# Patient Record
Sex: FEMALE | Race: WHITE | NOT HISPANIC OR LATINO | Employment: FULL TIME | URBAN - METROPOLITAN AREA
[De-identification: names, ages, dates, MRNs, and addresses within clinical notes are randomized per-mention and may not be internally consistent; named-entity substitution may affect disease eponyms.]

---

## 2024-01-03 ENCOUNTER — TRANSFERRED RECORDS (OUTPATIENT)
Dept: HEALTH INFORMATION MANAGEMENT | Facility: CLINIC | Age: 44
End: 2024-01-03
Payer: COMMERCIAL

## 2024-02-19 ENCOUNTER — MEDICAL CORRESPONDENCE (OUTPATIENT)
Dept: HEALTH INFORMATION MANAGEMENT | Facility: CLINIC | Age: 44
End: 2024-02-19
Payer: COMMERCIAL

## 2024-02-20 ENCOUNTER — TRANSFERRED RECORDS (OUTPATIENT)
Dept: MULTI SPECIALTY CLINIC | Facility: CLINIC | Age: 44
End: 2024-02-20

## 2024-02-20 LAB
GLUCOSE (EXTERNAL): 83 MG/DL (ref 74–100)
POTASSIUM (EXTERNAL): 4.3 MMOL/L (ref 3.4–5.1)

## 2024-02-20 NOTE — PROGRESS NOTES
GYNECOLOGIC ONCOLOGY CONSULT    Patient Name: MARKUS FINNEGAN Patient : 1980   Patient MRN: [id number]    Referring Physician: Shade Mcallister MD (General Surgery)   Primary GYN Oncologist: Jane Severino (Hematology/Oncology)  Date of Service: 2023     Reason for Consult:  Uterine fibroids  Heavy, vaginal bleeding  ER+/WI+/Her2+ breast cancer, on tamoxifen  Li-Fraumeni syndrome    History of Present Illness (Gyn Oncology):  Tiana is a shell 44 yo  premenopausal female with recent diagnosis of Stage IA triple-positive invasive carcinoma of right breast. She underwent surgery and initiated Tamoxifen 20 mg daily on 2023.    When she was undergoing imaging workup for her breast cancer, there was an enlarged uterus noted. Most recent PET CT scan on 9/15/2023 did not show FDG-avid uptake within the pelvis. However, given the abnormality is noted, she underwent a pelvic ultrasound on 10/03/2023. This was notable for a 12.9 x 7.3 x 7.1 cm uterus. There were several fibroids noted, a total of 4 varying in size by most around 3 to 5 cm in greatest dimension. Bilateral ovaries appeared normal. There is also a submucosal fibroid towards the lower uterine segment, and this one was about 4 cm the endometrium, otherwise, was hemangiomas, there is no free fluid noted. She was recommended for a referral to see GYN oncology for further discussion. In terms of her breast cancer treatment, there does not appear any strong indication for ovarian suppression. She is tolerating her tamoxifen, endocrine therapy well at this time. She has been on tamoxifen since December. She denies any side effects until February. After that, she notes heavy vaginal bleeding for 5-6 days. Last month, she had light vaginal bleeding, which lasted for a day. She has always had a heavy menstrual cycle. She denies fibroids or enlarged uterus during her pregnancy. She has recently tested positive for Li-Fraumeni syndrome, and she has  established care within the [facility] genetics department. She consulted with Miguel Angel Sterling MD on 10/31/2023 at [facility]. Her 2 sons were tested and were negative. Her daughter was found to be positive, and interestingly, did have a history of a brain tumor at age 3 yo, and is undergoing recommended screening. Patient denies any family history of gynecologic malignancies. She is here today for further information in regards to these ultrasound findings and is considering surgical management. She has an appointment later this week with a dermatologist to do a baseline skin cancer screening. She denies any abnormal Pap smears. Laparoscopic converted to open abdominal mass resection by Dr. Bright Connors. 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 She recovered well from post-operatively. She endorses post-op nausea with anesthesia. However, she is managing the symptoms well. She is not interested in cosmetic surgery for her breast at present. However, she will follow up with Dr. Graves in plastic surgery if she decides to do more future cosmetic surgeries. Her breast surgeon is Dr. Shade Mcallister MD.     Genetic Testing  Positive for germline Li-Fraumeni syndrome. Pathogenic variant in TP53 (heterozygous) associated with autosomal dominant Li-Fraumeni syndrome.    Review of Systems:  A complete 14-point review of systems is negative except as noted in the above history of present illness.    Past Medical History:  1. Hypertension  2. Adjustment disorder with anxiety  3. GERD  4. Li-Fraumeni syndrome    Surgical History:  1. Bilateral mastectomies with immediate reconstruction  by Aury Mcallister and Star - 2022  2. Laparoscopic converted to open abdominal mass resection by Dr. Bright Connors. 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 - 2023    OB/Gyn History:  .  x 3.  Age at menarche: 13 yo  Age at first birth: 21  yo  Ovarian suppression 2/2 breast cancer endocrine therapy. Currently, on Tamoxifen.  Denies hx of abnormal pap smears.  Denies hx of STIs.  Reports uterine fibroids. Denies any past hx of fibroids, this was recent diagnosis.  Denies hx of ovarian cysts.  Denies hx of endometriosis.  Reports hx of OCP use.  Vasectomy is current contraceptive method.  Denies hx of HRT use.  Currently, sexually active.    Allergies#  No known medication allergies    Medications:  Magnesium Oral 250 mg tablet 1 tablet BID  Venlafaxine 37.5 mg capsule,extended release 24hr TAKE ONE CAPSULE BY MOUTH DAILY  Lisinopril Oral 5 mg tablet 1 tablet Daily  Omeprazole Oral Delayed Release Capsule 20 mg capsule,delayed release(DR/EC) 1 capsule,delayed release(DR/EC) Daily  Tamoxifen Oral 20 mg tablet 1 tablet orally daily. Take whole with water.  Fexofenadine Oral 60 mg tablet 1 tablet BID  Amlodipine Oral 5 mg tablet 1 tablet Daily     Family History:  Mother - lung cancer at age 49 - heavy smoker. .  MGF - liver Ca at age 51. .  2 maternal aunts with breast cancer age < 50. Both alive.    Social History:  Smoking Status Smoking Tobacco : Former smoker; Smokeless Tobacco : Never used smokeless tobacco; Vaping : Never vaped  Former smoker, 1/4 PPD x 4 years. Quit in .  Reports occasional alcohol use, ~4-6 glasses of wine per week.  Denies illicit substance use.  . Lives at home with  and 2 children.  Works at [employer] in [employer].    Health Maintenance:  Pap smear: last in 2022 and reportedly normal  Mammogram: 2022 & led to breast Ca diagnosis  Colonoscopy: has never had a colonoscopy  DEXA scan: has never had a DEXA scan    Vital Signs:  Blood pressure: 122/85, Sitting, R arm, Regular, Pulse: 122, Temperature: 97.3 F, Respirations: 16, O2 sat: 99%, At Rest, Room Air, Pain Scale: 0, Height: 63 in, Weight: 153.5 lb, BSA: 1.73, BMI: 27.19 kg/m2     Physical Exam (Gyn  Oncology):  General: alert, cooperative, no acute distress  HEENT: normocephalic, trachea midline, no thyromegaly  LN: no supraclavicular, cervical or inguinal lymphadenopathy  Back: no CVA tenderness  Lungs: CTAB, good inspiratory effort, no wheezes  Cardiac: RRR, no murmurs heard  Abdomen: Well healed supraumbilical vertical midline incision about 10 cm in length. No other incisions are present.   Extremities: no edema, non-tender  Neurologic Exam: no focal deficits    Pelvic: Normal external genitalia without masses or lesions. On speculum examination, the cervix is very posterior but without masses or lesions. The vaginal mucosa appears moderately estrogenized without masses or lesions. On bimanual examination, the vaginal mucosa and cervix are palpably smooth. The uterus is noted to be moderately enlarged to about 14 weeks size and there is fullness in the posterior cul-de-sac secondary to the fibroids. The uterus is freely mobile and there appears to be adequate room within the pelvis for attempt at a vaginal specimen removal during a minimally invasive surgery. There does not appear to be any palpable adnexal fullness. Rectal vaginal examination deferred.     Laboratory Data:  CBC  Lab Results 03/23/2023 02/28/2023 02/20/2023 02/07/2023 01/11/2023 12/13/2022    CBC               CMP  Lab Results 03/23/2023 02/28/2023 02/20/2023 02/07/2023 01/11/2023 12/13/2022    Chemistries                 Albumin g/dL       4.7 4.7       Total protein g/dL       7.7 7.8       Bilirubin, total mg/dL       1.2 0.9       Bilirubin, direct mg/dL       0.4 (H) 0.3       Alkaline phosphatase U/L       67 82       AST/SGOT U/L       44 (H) 117 (H)       ALT/SGPT U/L       40 93 (H)     Lab Results 03/23/2023 02/28/2023 02/20/2023 02/07/2023 01/11/2023 12/13/2022    Tumor Markers                    Imaging:  Viewpoint Report - Final 10/03/2023 20:25  Indication  enlarged uterus  Method  Transvaginal ultrasound examination. View:  Suboptimal view: fibroids prevented a clear view  Uterus  fibroid. Long 129 mm x ap 73 mm x tr 71 mm. Vol 350.0 cm3  Position: anteverted  Malformations: none identified  Myometrium: fibroids  Endometrium: homogeneous, fibroid NELLA. Endometrial thickness, total 11.5 mm  Cervix details: normal  Fibroid(s) 1. Size 35 mm x 35 mm x 40 mm. Mean 36.8 mm. Vol 25.931 cm3. Intramural.  Anterior  2. Size 47 mm x 49 mm x 35 mm. Mean 43.5 mm. Vol 41.927 cm3. Intramural. Right  lateral wall  3. Size 30 mm x 32 mm x 36 mm. Mean 32.7 mm. Vol 18.127 cm3. Intramural. Fundal  4. Size 40 mm x 15 mm x 17 mm. Mean 23.9 mm. Vol 5.245 cm3. visualized without  fluid enhancement  Right Ovary  Normal. Outline: smooth. Morphology: normal. Size 31 mm x 25 mm x 21 mm. Vol  8.4 cm3  Left Ovary  Normal. Outline: smooth. Morphology: normal. Size 34 mm x 34 mm x 29 mm. Vol  17.1 cm3  Cul de Sac  Visualized. No free fluid visualized    Impression  Fibroid uterus with the four largest fibroids measured as noted above. There appears to be a submucosal fibroid  towards the lower uterine segment that measures 40mm.  The endometrium is otherwise homogenous. The ovaries appear normal bilaterally. No free fluid is noted.    Comment  Pelvic ultrasound alone is not considered diagnostic. Clinical correlation recommended.       Problems:  Breast cancer, female ( Stage Date: 10/25/2022, Stage IA (Right breast overlapping sites, N0, M0, ER Status: Positive, TX Status: Positive, HER-2/shyanne Status: Positive)-Clinical  Date of Dx:10/25/2022 Extent of Disease: Evidence of local disease; Disease State: Initial diagnosis; Histopathologic Type: Ductal invasive carcinoma; Menopausal Status: Premenopausal; )  Adjustment disorder with anxiety  Adnexal mass  Arthropathy (disorder)  At risk of malignancy (finding)  Estrogen receptor positive status [ER+] ( Date of Dx:11/04/2022 )  Family history of malignant neoplasm of breast (situation)  Hot flash caused by medication  (disorder)  Lesion of liver (finding)  Li-Fraumeni syndrome (disorder)  Liver function tests abnormal (finding)  Mass of retroperitoneal structure (finding)  Survivorship issues  Uterine fibroids  Vitamin D deficiency (disorder)    Assessment & Plan (Gyn Oncology):  Tiana is a shell 42 yo  premenopausal female with recent diagnosis of Stage IA triple-positive invasive carcinoma of right breast. She underwent surgery and initiated Tamoxifen 20 mg daily on 2023. During breast cancer workup in treatment, has been noted to have abnormal uterine findings with fibroids. Recently discovered to have Li-Fraumeni syndrome.     1. Uterine fibroids and heavy bleeding  Discussed with patient risks, benefits, and alternatives to definitive surgical management for treatment of the heavy vaginal bleeding as well as ability to definitively test the tissue with the uterus to evaluate for any pre-malignant or malignant conditions.  After today's pelvic examination, I do feel she is an excellent candidate for a minimally invasive surgery with a robotic approach. Also discussed injection of ICG dye for possible bilateral sentinel pelvic lymph node dissections.  Discussed intraoperative frozen section analysis and further staging if that were to be positive in regards to the endometrium and/or some of the different types of fibroid cancers, such as carcinosarcoma, et cetera.  Discussed that staging would involve sentinel lymph node mapping and sentinel lymph node dissection. Discussed that sentinel lymph node dissection can decrease overall rate of lymphedema in lower extremities from about 30% with full lymph node dissections to <5%.   Discussed the importance of removal of uterus without any morcellation or disruption, in the event that cancer cells could be seeded into the abdominal cavity. Would convert to horizontal mini-laparotomy and would still likely go home on POD#0. Would ask anesthesia for TAP block, in the  event that a mini-laparotomy may need to be performed. This would also help with narcotic-sparing postoperative course. She is on board with the plan for a TAP block.  2. Triple positive breast cancer on tamoxifen therapy since January 2023.  Discussed that there is association in elevated risk of certain types of endometrial cancers on tamoxifen therapy.  Given some episodes of abnormal and heavy vaginal bleeding, would recommend either very close follow-up with repeat imaging and possible endometrial biopsy. However, surgical management would be warranted at this point.  From our understanding. LFS does not appear to have significantly elevated risks of endometrial or ovarian cancers. However, genetics is constantly evolving and, thus, the cancer screening recommendations and the known cancer associations can change over time. She is established with the [facility] genetics department.  Medical oncology does not specifically need surgical ovarian suppression, per their notes.  Discussed pros and cons of preservation of ovaries versus removal of ovaries.  Patient is already in ovarian suppression due to her adjuvant endocrine therapy. Discussed that there are no good screening strategies for detection of future ovarian cancers. However, there will need to be some individualized decision-making with or without removal of ovaries.  Discussed that removal of both fallopian tubes would be indicated and does offer risk reduction for future ovarian cancers, given etiology of most cancers is the fimbriated ends of the fallopian tubes.  With Li-Fraumeni syndrome, the patient is established with [facility]. Again, no significant elevated risk of known endometrial or ovarian cancers. Thus, would not be recommended surgical risk reduction just from a genetics standpoint alone.  However, patient feels strongly about preventative measures and does have concerns about future cancer risk. Patient feels like she is leaning  towards surgical intervention. She desires more time to consider her options.  Patient aware medical oncology would be updated if we did remove ovaries as they would likely switch her adjuvant endocrine therapy.   Patient was given hysterectomy packet to read more information in regards to after cares. We had a very good conversation today. Aware that sometimes a mini-laparotomy is necessary for specimen removal if having difficulty removing vaginally as would not morcellated or bi-valve uterus for risk of seeding any potential malignancies. Patient will call our office back after she has had some time to process the information. And if she decides to proceed with surgery, aware that removal of ovaries can continue to be discussed and I would support her decision, in either scenario, after she has had some time to consider her options.      Treatment Plan and Consent  Current treatment plan of definitive surgery vs watchful waiting/close observation was reviewed in detail with the patient. See counseling above. Specifically, the counseling included: goals of the treatment, prognosis, frequency, intended benefits, associated risks/harms and side effects and medically reasonable alternatives.    I spent a total of 75 minutes of cumulative time in care of this patient, which included >50% of time spent in direct patient care which included patient interview, examination, and treatment counseling. The remainder of the time was spent in medical documentation, review of records and care coordination.    I provided the patient an opportunity to ask questions and I answered all of their treatment related questions to the best of my ability.  The patient expressed understanding and consent to this plan of care.    Pain Care Management:  Pain Scale: 0      The patient and family/friends if present were apprised of the use of  remote documentation service and all parties consented to conducting the visit in this  manner.    Documentation assistance provided by Nate Doran, scribing for Anna Bajwa MD on 11/07/2023.    I, Anna Bajwa MD,  personally performed the services described in this documentation, and it is both accurate and complete.    Anna Bajwa MD  Phone: [phone]  Fax: [phone]

## 2024-02-23 RX ORDER — AMLODIPINE BESYLATE 5 MG/1
5 TABLET ORAL DAILY
COMMUNITY

## 2024-02-23 RX ORDER — LISINOPRIL 10 MG/1
10 TABLET ORAL DAILY
COMMUNITY

## 2024-02-23 RX ORDER — VENLAFAXINE HYDROCHLORIDE 37.5 MG/1
37.5 TABLET, EXTENDED RELEASE ORAL DAILY
COMMUNITY

## 2024-02-23 RX ORDER — TAMOXIFEN CITRATE 20 MG/1
20 TABLET ORAL DAILY
COMMUNITY

## 2024-02-23 NOTE — OP NOTE
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     ESTIMATED BLOOD LOSS: 50 cc     IV FLUIDS: 1700 ml crystalloid    URINE OUTPUT: 15 cc clear urine     INDICATIONS: Adelaida Tolentino is a 44 yo  premenopausal female with recent diagnosis of Stage IA triple-positive invasive carcinoma of right breast. She underwent surgery and initiated Tamoxifen 20 mg daily on 2023. When she was undergoing imaging workup for her breast cancer, there was an enlarged uterus noted. Most recent PET CT scan on 9/15/2023 did not show FDG-avid uptake within the pelvis. However, given the abnormality is noted, she underwent a pelvic ultrasound on 10/03/2023. This was notable for a 12.9 x 7.3 x 7.1 cm uterus. There were several fibroids noted, a total of 4 varying in size by most around 3 to 5 cm in greatest dimension. Bilateral ovaries appeared normal. There was also a submucosal fibroid towards the lower uterine segment, and this one was about 4 cm. She was recommended for a referral to see GYN oncology for further discussion. In terms of her breast cancer treatment, there does not appear any strong indication for ovarian suppression. She is tolerating her tamoxifen, endocrine therapy well at this time. She has been on tamoxifen since December. She has heavy, vaginal bleeding with history of heavy menses. She has recently tested positive for Li-Fraumeni syndrome, and she has established  care within the [facility] genetics department. She consulted with Miguel Angel Sterling MD on 10/31/2023 at [facility]. Her 2 sons were tested and were negative. Her daughter was found to be positive, and interestingly, did have a history of a brain tumor at age 3 yo, and is undergoing recommended screening. Patient denies any family history of gynecologic malignancies. She desired to proceed with definitive surgical management.     FINDINGS:  On laparoscopy, there was moderately enlarged, multifibroid uterus with paracervical fibroid on right side. Bilateral ovaries were polycystic-appearing and mildly enlarged, but, grossly normal. Bilateral ureters were without evidence of hydronephrosis. There was no bulky retroperitoneal adenopathy. Bilateral sentinel lymph node mapping was performed and notable for bilateral obturator space. Intraoperative frozen section analysis revealed endometrial polyp that was negative for malignancy, thus, no cancer staging procedures were indicated. Peritoneal surfaces were smooth including bilateral hemidiaphragms. The liver edge, stomach and omentum were all grossly normal. Small bowel, appendix and colon were without any obvious abnormalities. Bilateral ovaries were preserved and notable for good perfusion throughout the case. Neither side had excess laxity. A total of 10 ml of Surgiflo was placed along surgical dissection sites at the conclusion of the procedure.    At the conclusion of the procedure, the vagina was inspected and there were no perineal lacerations. The vaginal cuff was palpably intact. There was no vaginal bleeding.    SPECIMENS:   ID Type Source Tests Collected by Time Destination   1 : frozen on endometrium Tissue Uterus, Cervix, Bilateral Fallopian Tubes SURGICAL PATHOLOGY EXAM Anna Bajwa MD 2/26/2024  9:38 AM        COMPLICATIONS: None.    CONDITION: Stable to PACU.    OPERATIVE PROCEDURE IN DETAIL: Consent was reviewed with the patient in the preoperative  setting and confirmed. She received prophylactic antibiotics with IV Cefazolin 2 grams and IV Metronidazole 500 milligrams. In addition, she received prophylactic SQ Heparin 5,000 units for venous thromboembolism prevention. A surgical debriefing was performed with the operating room team prior to patient entry into the operating room. The patient was transferred to the operating room and placed in dorsal supine position. General anesthetic was obtained in the usual manner without noted difficulties. The patient was then positioned onto yellowfin stirrups. The patient was prepped and draped for the above-mentioned procedure.    Timeout was called at which point the patient's name, procedure and operative site was confirmed by the operative team. Initially, the instruments for the vaginal manipulator were positioned, a speculum was placed in the vagina. The Meléndez catheter was placed. The anterior lip of the cervix was grasped. Next, the uterus sounded to 10 cm, and cervix was serially dilated. Indocyanine green (ICG) dye was injected superficially and deep at 3 o'clock and 9 o'clock. Approximately 1 cc was used x 4. The medium VCare vaginal manipulator was then inserted and the cervical cup affixed without any difficulty. After changing gown and gloves, attention was then turned to the upper abdomen. Initially, the umbilicus was elevated and the Veress needle introduced through the base of the umbilicus. The abdomen was insufflated with an opening pressure of 2 mmHg. The Veress needle was removed. Sites for the da Fannie XI laparoscopic ports were demarcated, total of 5, initiating' midline approximately 4 cm above the umbilicus and positioned in a straight line around the upper abdomen. The initial midline 8 mm port was inserted without difficulties, the left 2 lateral 8 mm da Fannie ports were inserted and on the right 2 lateral 8 mm da Fannie ports all under direct visualization without any vascular injury noted. The  lateral most right port was 8 mm AirSeal port. CO2 insufflation was switched over to AirSeal mode without any difficulties. At this point, the patient was placed into steep Trendelenburg. The pelvis was inspected as well as the upper abdomen as noted above. Bowels were packed up into the upper abdomen with gentle traction. At this point, da Fannie XI was docked onto the ports, all appropriate instruments were inserted.     The procedure was initiated by sealing and transecting bilateral round ligaments. This allowed dissection of the vesicouterine peritoneum. Next, each retroperitoneum was entered via dissection of each posterior leaf of the broad ligament. Bilateral ureters were identified. Infundibulopelvic ligaments were preserved, bilaterally. Each fimbriated edge of the fallopian tubes were grasped, elevated and dissection off the mesosalpinx using the synchroseal device to both seal and transect. Each fallopian tube was dissected to the level of the uterine cornua. The synchroseal was used to multiply seal and transect the utero-ovarian ligaments at the uterine level. The peritoneum was further undermined to allow for retraction of each ovary for planned preservation.     Next, bilateral sentinel lymph node mapping was performed. The da Fannie XI firefly mode was utilized to denote the ICG uptake along lymphatic channels. A sentinel node was mapped bilaterally with further details above.     At this point, we initiated the the hysterectomy. The bladder flap was developed well at the upper vagina. The uterus had adequate mobility, bilateral uterine arteries could be isolated and these were cauterized with the bipolar cautery and transected. Initially on the right, we extended along the cardinal and uterosacral ligaments, staying close to the cervix to the level of the upper vagina. This was cauterized and transected using the synchroseal device. This side was more technically challenging given the right  "paracervical fibroid. There was also more collateral blood flow. We isolated out the left cardinal ligament and uterosacral ligament which were cauterized and transected. At this point, we were able to palpate the line of the VCare cup at the level of the upper vagina in a circumferential manner. We incised along the upper vagina to resect the cervix and uterus. The VCare uterine manipulator was disconnected from the surgical specimen. Next, a 15 mm Endocatch bag was inserted transvaginally for specimen containment. The specimen was brought out through the vaginal opening and labeled \"uterus, cervix, and bilateral fallopian tubes\" and sent to surgical pathology for intraoperative frozen section analysis of the endometrium.    A wet laparotomy sponge was inserted to obtain adequate insufflation. The vaginal cuff had been well-developed and clearly the bladder was out of the way. The vaginal cuff was closed with running V-lock suture with excellent reapproximation.     The right ureter was again identified. The right ureter was skeletonized from the level of the pelvic brim to the tunnel of Wertheim. This allowed for further cautery to the uterine pedicle near the origin of the uterine artery and uterine vein. This reduced overall blood loss, by further addressing this area given more collateral blood flow on this side.     Intraoperative frozen section returned negative for malignancy, thus, no cancer staging procedures were indicated.      At this point, the vaginal balloon was removed from the vagina. The pelvis was re-inspected and copiously irrigated. A total of 10 cc of Surgiflo was placed along the pelvic dissection beds. Bilateral ovaries remained in situ, with good perfusion noted and without any excess laxity.    All laparoscopic instruments and ports were now removed and CO2 allowed to escape from the abdomen. The "[employer]i XI robot was undocked without incident. Skin was reapproximated with 4-0 Vicryl " sutures and skin glue applied. Local analgesia was applied to all incisions at time of closure.     The laparotomy sponge was removed from the vagina. The Meléndez catheter was also removed. The vagina was inspected and without any evidence of perineal lacerations and/or bleeding. The vaginal cuff was palpably intact.     Stephanie Hobbs PA-C, was present and assisted the entire procedure. She assisted with injection of ICG dye, placement of uterine manipulator, docking of robotic arms, adequate visualization, retraction, uterine manipulation, bedside assisting via assistant port, specimen handling/retrieval, undocking of robotic arms, injection of local analgesia, and skin closure.      All sponge, lap, needle and instrument counts were correct x 2. The patient tolerated the procedure well and there were no complications. She was returned to the supine position and general anesthesia was reversed without difficulty. She was taken to the recovery room in stable condition. I was present and scrubbed the entire procedure.    Anna Bajwa MD  Gynecologic Oncology  [facility]  02/26/24

## 2024-02-26 ENCOUNTER — HOSPITAL ENCOUNTER (OUTPATIENT)
Facility: CLINIC | Age: 44
Discharge: HOME | End: 2024-02-26
Attending: OBSTETRICS & GYNECOLOGY | Admitting: OBSTETRICS & GYNECOLOGY
Payer: COMMERCIAL

## 2024-02-26 ENCOUNTER — ANESTHESIA (OUTPATIENT)
Dept: SURGERY | Facility: CLINIC | Age: 44
End: 2024-02-26
Payer: COMMERCIAL

## 2024-02-26 ENCOUNTER — ANESTHESIA EVENT (OUTPATIENT)
Dept: SURGERY | Facility: CLINIC | Age: 44
End: 2024-02-26
Payer: COMMERCIAL

## 2024-02-26 VITALS
BODY MASS INDEX: 25.91 KG/M2 | SYSTOLIC BLOOD PRESSURE: 106 MMHG | HEIGHT: 63 IN | OXYGEN SATURATION: 98 % | TEMPERATURE: 98.2 F | WEIGHT: 146.2 LBS | RESPIRATION RATE: 12 BRPM | DIASTOLIC BLOOD PRESSURE: 76 MMHG | HEART RATE: 64 BPM

## 2024-02-26 DIAGNOSIS — D25.9 UTERINE LEIOMYOMA, UNSPECIFIED LOCATION: Primary | ICD-10-CM

## 2024-02-26 LAB
ABO/RH(D): NORMAL
ANTIBODY SCREEN: NEGATIVE
ERYTHROCYTE [DISTWIDTH] IN BLOOD BY AUTOMATED COUNT: 13.2 % (ref 10–15)
HCG UR QL: NEGATIVE
HCT VFR BLD AUTO: 37.1 % (ref 35–47)
HGB BLD-MCNC: 13.1 G/DL (ref 11.7–15.7)
MCH RBC QN AUTO: 33 PG (ref 26.5–33)
MCHC RBC AUTO-ENTMCNC: 35.3 G/DL (ref 31.5–36.5)
MCV RBC AUTO: 94 FL (ref 78–100)
PLATELET # BLD AUTO: 256 10E3/UL (ref 150–450)
RBC # BLD AUTO: 3.97 10E6/UL (ref 3.8–5.2)
SPECIMEN EXPIRATION DATE: NORMAL
WBC # BLD AUTO: 4.3 10E3/UL (ref 4–11)

## 2024-02-26 PROCEDURE — 258N000001 HC RX 258: Performed by: OBSTETRICS & GYNECOLOGY

## 2024-02-26 PROCEDURE — 250N000025 HC SEVOFLURANE, PER MIN: Performed by: OBSTETRICS & GYNECOLOGY

## 2024-02-26 PROCEDURE — 258N000003 HC RX IP 258 OP 636

## 2024-02-26 PROCEDURE — 360N000080 HC SURGERY LEVEL 7, PER MIN: Performed by: OBSTETRICS & GYNECOLOGY

## 2024-02-26 PROCEDURE — 250N000011 HC RX IP 250 OP 636

## 2024-02-26 PROCEDURE — 710N000012 HC RECOVERY PHASE 2, PER MINUTE: Performed by: OBSTETRICS & GYNECOLOGY

## 2024-02-26 PROCEDURE — 258N000003 HC RX IP 258 OP 636: Performed by: ANESTHESIOLOGY

## 2024-02-26 PROCEDURE — 58570 TLH UTERUS 250 G OR LESS: CPT | Performed by: ANESTHESIOLOGY

## 2024-02-26 PROCEDURE — 250N000011 HC RX IP 250 OP 636: Performed by: ANESTHESIOLOGY

## 2024-02-26 PROCEDURE — 88307 TISSUE EXAM BY PATHOLOGIST: CPT | Mod: 26 | Performed by: STUDENT IN AN ORGANIZED HEALTH CARE EDUCATION/TRAINING PROGRAM

## 2024-02-26 PROCEDURE — 250N000013 HC RX MED GY IP 250 OP 250 PS 637: Performed by: PHYSICIAN ASSISTANT

## 2024-02-26 PROCEDURE — 250N000009 HC RX 250

## 2024-02-26 PROCEDURE — 250N000011 HC RX IP 250 OP 636: Mod: JZ | Performed by: PHYSICIAN ASSISTANT

## 2024-02-26 PROCEDURE — 88331 PATH CONSLTJ SURG 1 BLK 1SPC: CPT | Mod: 26 | Performed by: PATHOLOGY

## 2024-02-26 PROCEDURE — 81025 URINE PREGNANCY TEST: CPT | Performed by: PHYSICIAN ASSISTANT

## 2024-02-26 PROCEDURE — 250N000013 HC RX MED GY IP 250 OP 250 PS 637: Performed by: ANESTHESIOLOGY

## 2024-02-26 PROCEDURE — 36415 COLL VENOUS BLD VENIPUNCTURE: CPT | Performed by: PHYSICIAN ASSISTANT

## 2024-02-26 PROCEDURE — 370N000017 HC ANESTHESIA TECHNICAL FEE, PER MIN: Performed by: OBSTETRICS & GYNECOLOGY

## 2024-02-26 PROCEDURE — 88331 PATH CONSLTJ SURG 1 BLK 1SPC: CPT | Mod: TC | Performed by: OBSTETRICS & GYNECOLOGY

## 2024-02-26 PROCEDURE — 58570 TLH UTERUS 250 G OR LESS: CPT

## 2024-02-26 PROCEDURE — 250N000011 HC RX IP 250 OP 636: Performed by: OBSTETRICS & GYNECOLOGY

## 2024-02-26 PROCEDURE — 86900 BLOOD TYPING SEROLOGIC ABO: CPT | Performed by: PHYSICIAN ASSISTANT

## 2024-02-26 PROCEDURE — 85027 COMPLETE CBC AUTOMATED: CPT | Performed by: PHYSICIAN ASSISTANT

## 2024-02-26 PROCEDURE — 250N000011 HC RX IP 250 OP 636: Performed by: PHYSICIAN ASSISTANT

## 2024-02-26 PROCEDURE — 272N000001 HC OR GENERAL SUPPLY STERILE: Performed by: OBSTETRICS & GYNECOLOGY

## 2024-02-26 PROCEDURE — 710N000009 HC RECOVERY PHASE 1, LEVEL 1, PER MIN: Performed by: OBSTETRICS & GYNECOLOGY

## 2024-02-26 PROCEDURE — 999N000141 HC STATISTIC PRE-PROCEDURE NURSING ASSESSMENT: Performed by: OBSTETRICS & GYNECOLOGY

## 2024-02-26 PROCEDURE — 250N000012 HC RX MED GY IP 250 OP 636 PS 637: Performed by: ANESTHESIOLOGY

## 2024-02-26 PROCEDURE — 250N000009 HC RX 250: Performed by: OBSTETRICS & GYNECOLOGY

## 2024-02-26 RX ORDER — SODIUM CHLORIDE, SODIUM LACTATE, POTASSIUM CHLORIDE, CALCIUM CHLORIDE 600; 310; 30; 20 MG/100ML; MG/100ML; MG/100ML; MG/100ML
INJECTION, SOLUTION INTRAVENOUS CONTINUOUS PRN
Status: DISCONTINUED | OUTPATIENT
Start: 2024-02-26 | End: 2024-02-26

## 2024-02-26 RX ORDER — SODIUM CHLORIDE, SODIUM LACTATE, POTASSIUM CHLORIDE, CALCIUM CHLORIDE 600; 310; 30; 20 MG/100ML; MG/100ML; MG/100ML; MG/100ML
INJECTION, SOLUTION INTRAVENOUS CONTINUOUS
Status: DISCONTINUED | OUTPATIENT
Start: 2024-02-26 | End: 2024-02-26 | Stop reason: HOSPADM

## 2024-02-26 RX ORDER — INDOCYANINE GREEN AND WATER 25 MG
KIT INJECTION PRN
Status: DISCONTINUED | OUTPATIENT
Start: 2024-02-26 | End: 2024-02-26 | Stop reason: HOSPADM

## 2024-02-26 RX ORDER — HYDROMORPHONE HCL IN WATER/PF 6 MG/30 ML
0.2 PATIENT CONTROLLED ANALGESIA SYRINGE INTRAVENOUS EVERY 5 MIN PRN
Status: DISCONTINUED | OUTPATIENT
Start: 2024-02-26 | End: 2024-02-26 | Stop reason: HOSPADM

## 2024-02-26 RX ORDER — OXYCODONE HYDROCHLORIDE 5 MG/1
5-10 TABLET ORAL EVERY 4 HOURS PRN
Qty: 12 TABLET | Refills: 0 | Status: SHIPPED | OUTPATIENT
Start: 2024-02-26

## 2024-02-26 RX ORDER — CEFAZOLIN SODIUM/WATER 2 G/20 ML
2 SYRINGE (ML) INTRAVENOUS SEE ADMIN INSTRUCTIONS
Status: DISCONTINUED | OUTPATIENT
Start: 2024-02-26 | End: 2024-02-26 | Stop reason: HOSPADM

## 2024-02-26 RX ORDER — AMOXICILLIN 250 MG
1-2 CAPSULE ORAL 2 TIMES DAILY PRN
Qty: 30 TABLET | Refills: 0 | Status: SHIPPED | OUTPATIENT
Start: 2024-02-26

## 2024-02-26 RX ORDER — METRONIDAZOLE 500 MG/100ML
500 INJECTION, SOLUTION INTRAVENOUS
Status: COMPLETED | OUTPATIENT
Start: 2024-02-26 | End: 2024-02-26

## 2024-02-26 RX ORDER — APREPITANT 40 MG/1
40 CAPSULE ORAL ONCE
Status: COMPLETED | OUTPATIENT
Start: 2024-02-26 | End: 2024-02-26

## 2024-02-26 RX ORDER — DEXAMETHASONE SODIUM PHOSPHATE 4 MG/ML
INJECTION, SOLUTION INTRA-ARTICULAR; INTRALESIONAL; INTRAMUSCULAR; INTRAVENOUS; SOFT TISSUE PRN
Status: DISCONTINUED | OUTPATIENT
Start: 2024-02-26 | End: 2024-02-26

## 2024-02-26 RX ORDER — HEPARIN SODIUM 5000 [USP'U]/.5ML
5000 INJECTION, SOLUTION INTRAVENOUS; SUBCUTANEOUS
Status: COMPLETED | OUTPATIENT
Start: 2024-02-26 | End: 2024-02-26

## 2024-02-26 RX ORDER — ACETAMINOPHEN 500 MG
1000 TABLET ORAL ONCE
Status: DISCONTINUED | OUTPATIENT
Start: 2024-02-26 | End: 2024-02-26 | Stop reason: HOSPADM

## 2024-02-26 RX ORDER — FENTANYL CITRATE 0.05 MG/ML
25 INJECTION, SOLUTION INTRAMUSCULAR; INTRAVENOUS EVERY 5 MIN PRN
Status: DISCONTINUED | OUTPATIENT
Start: 2024-02-26 | End: 2024-02-26 | Stop reason: HOSPADM

## 2024-02-26 RX ORDER — KETOROLAC TROMETHAMINE 30 MG/ML
INJECTION, SOLUTION INTRAMUSCULAR; INTRAVENOUS PRN
Status: DISCONTINUED | OUTPATIENT
Start: 2024-02-26 | End: 2024-02-26

## 2024-02-26 RX ORDER — CEFAZOLIN SODIUM/WATER 2 G/20 ML
2 SYRINGE (ML) INTRAVENOUS
Status: COMPLETED | OUTPATIENT
Start: 2024-02-26 | End: 2024-02-26

## 2024-02-26 RX ORDER — NALOXONE HYDROCHLORIDE 0.4 MG/ML
0.1 INJECTION, SOLUTION INTRAMUSCULAR; INTRAVENOUS; SUBCUTANEOUS
Status: DISCONTINUED | OUTPATIENT
Start: 2024-02-26 | End: 2024-02-26 | Stop reason: HOSPADM

## 2024-02-26 RX ORDER — BUPIVACAINE HYDROCHLORIDE 5 MG/ML
INJECTION, SOLUTION EPIDURAL; INTRACAUDAL PRN
Status: DISCONTINUED | OUTPATIENT
Start: 2024-02-26 | End: 2024-02-26 | Stop reason: HOSPADM

## 2024-02-26 RX ORDER — KETAMINE HYDROCHLORIDE 10 MG/ML
INJECTION INTRAMUSCULAR; INTRAVENOUS PRN
Status: DISCONTINUED | OUTPATIENT
Start: 2024-02-26 | End: 2024-02-26

## 2024-02-26 RX ORDER — FENTANYL CITRATE 0.05 MG/ML
50 INJECTION, SOLUTION INTRAMUSCULAR; INTRAVENOUS EVERY 5 MIN PRN
Status: DISCONTINUED | OUTPATIENT
Start: 2024-02-26 | End: 2024-02-26 | Stop reason: HOSPADM

## 2024-02-26 RX ORDER — FENTANYL CITRATE 50 UG/ML
INJECTION, SOLUTION INTRAMUSCULAR; INTRAVENOUS PRN
Status: DISCONTINUED | OUTPATIENT
Start: 2024-02-26 | End: 2024-02-26

## 2024-02-26 RX ORDER — ACETAMINOPHEN 500 MG
1000 TABLET ORAL ONCE
Status: COMPLETED | OUTPATIENT
Start: 2024-02-26 | End: 2024-02-26

## 2024-02-26 RX ORDER — DEXMEDETOMIDINE HYDROCHLORIDE 4 UG/ML
INJECTION, SOLUTION INTRAVENOUS PRN
Status: DISCONTINUED | OUTPATIENT
Start: 2024-02-26 | End: 2024-02-26

## 2024-02-26 RX ORDER — PROPOFOL 10 MG/ML
INJECTION, EMULSION INTRAVENOUS CONTINUOUS PRN
Status: DISCONTINUED | OUTPATIENT
Start: 2024-02-26 | End: 2024-02-26

## 2024-02-26 RX ORDER — OXYCODONE HYDROCHLORIDE 5 MG/1
5 TABLET ORAL
Status: COMPLETED | OUTPATIENT
Start: 2024-02-26 | End: 2024-02-26

## 2024-02-26 RX ORDER — PROPOFOL 10 MG/ML
INJECTION, EMULSION INTRAVENOUS PRN
Status: DISCONTINUED | OUTPATIENT
Start: 2024-02-26 | End: 2024-02-26

## 2024-02-26 RX ORDER — HYDROMORPHONE HCL IN WATER/PF 6 MG/30 ML
0.4 PATIENT CONTROLLED ANALGESIA SYRINGE INTRAVENOUS EVERY 5 MIN PRN
Status: DISCONTINUED | OUTPATIENT
Start: 2024-02-26 | End: 2024-02-26 | Stop reason: HOSPADM

## 2024-02-26 RX ORDER — OXYCODONE HYDROCHLORIDE 5 MG/1
5 TABLET ORAL EVERY 4 HOURS PRN
Status: DISCONTINUED | OUTPATIENT
Start: 2024-02-26 | End: 2024-02-26 | Stop reason: HOSPADM

## 2024-02-26 RX ORDER — MAGNESIUM HYDROXIDE 1200 MG/15ML
LIQUID ORAL PRN
Status: DISCONTINUED | OUTPATIENT
Start: 2024-02-26 | End: 2024-02-26 | Stop reason: HOSPADM

## 2024-02-26 RX ORDER — ACETAMINOPHEN 325 MG/1
975 TABLET ORAL ONCE
Status: DISCONTINUED | OUTPATIENT
Start: 2024-02-26 | End: 2024-02-26 | Stop reason: HOSPADM

## 2024-02-26 RX ORDER — IBUPROFEN 400 MG/1
800 TABLET, FILM COATED ORAL ONCE
Status: DISCONTINUED | OUTPATIENT
Start: 2024-02-26 | End: 2024-02-26 | Stop reason: HOSPADM

## 2024-02-26 RX ORDER — ONDANSETRON 2 MG/ML
4 INJECTION INTRAMUSCULAR; INTRAVENOUS EVERY 30 MIN PRN
Status: DISCONTINUED | OUTPATIENT
Start: 2024-02-26 | End: 2024-02-26 | Stop reason: HOSPADM

## 2024-02-26 RX ORDER — LIDOCAINE HYDROCHLORIDE 20 MG/ML
INJECTION, SOLUTION INFILTRATION; PERINEURAL PRN
Status: DISCONTINUED | OUTPATIENT
Start: 2024-02-26 | End: 2024-02-26

## 2024-02-26 RX ORDER — ONDANSETRON 2 MG/ML
INJECTION INTRAMUSCULAR; INTRAVENOUS PRN
Status: DISCONTINUED | OUTPATIENT
Start: 2024-02-26 | End: 2024-02-26

## 2024-02-26 RX ORDER — ONDANSETRON 4 MG/1
4 TABLET, ORALLY DISINTEGRATING ORAL EVERY 30 MIN PRN
Status: DISCONTINUED | OUTPATIENT
Start: 2024-02-26 | End: 2024-02-26 | Stop reason: HOSPADM

## 2024-02-26 RX ADMIN — SUGAMMADEX 200 MG: 100 INJECTION, SOLUTION INTRAVENOUS at 10:31

## 2024-02-26 RX ADMIN — LIDOCAINE HYDROCHLORIDE 100 MG: 20 INJECTION, SOLUTION INFILTRATION; PERINEURAL at 08:01

## 2024-02-26 RX ADMIN — FENTANYL CITRATE 50 MCG: 50 INJECTION INTRAMUSCULAR; INTRAVENOUS at 08:01

## 2024-02-26 RX ADMIN — ROCURONIUM BROMIDE 50 MG: 50 INJECTION, SOLUTION INTRAVENOUS at 09:05

## 2024-02-26 RX ADMIN — HEPARIN SODIUM 5000 UNITS: 5000 INJECTION, SOLUTION INTRAVENOUS; SUBCUTANEOUS at 06:25

## 2024-02-26 RX ADMIN — DEXAMETHASONE SODIUM PHOSPHATE 8 MG: 4 INJECTION, SOLUTION INTRA-ARTICULAR; INTRALESIONAL; INTRAMUSCULAR; INTRAVENOUS; SOFT TISSUE at 08:05

## 2024-02-26 RX ADMIN — METRONIDAZOLE 500 MG: 500 INJECTION, SOLUTION INTRAVENOUS at 06:22

## 2024-02-26 RX ADMIN — PROPOFOL 10 MG: 10 INJECTION, EMULSION INTRAVENOUS at 10:34

## 2024-02-26 RX ADMIN — ROCURONIUM BROMIDE 20 MG: 50 INJECTION, SOLUTION INTRAVENOUS at 09:47

## 2024-02-26 RX ADMIN — DEXMEDETOMIDINE HYDROCHLORIDE 10 MCG: 200 INJECTION INTRAVENOUS at 08:38

## 2024-02-26 RX ADMIN — PROPOFOL 10 MG: 10 INJECTION, EMULSION INTRAVENOUS at 10:36

## 2024-02-26 RX ADMIN — APREPITANT 40 MG: 40 CAPSULE ORAL at 07:30

## 2024-02-26 RX ADMIN — SODIUM CHLORIDE, POTASSIUM CHLORIDE, SODIUM LACTATE AND CALCIUM CHLORIDE: 600; 310; 30; 20 INJECTION, SOLUTION INTRAVENOUS at 08:52

## 2024-02-26 RX ADMIN — ACETAMINOPHEN 1000 MG: 500 TABLET, FILM COATED ORAL at 07:30

## 2024-02-26 RX ADMIN — PROPOFOL 200 MG: 10 INJECTION, EMULSION INTRAVENOUS at 08:01

## 2024-02-26 RX ADMIN — KETOROLAC TROMETHAMINE 30 MG: 30 INJECTION, SOLUTION INTRAMUSCULAR at 10:23

## 2024-02-26 RX ADMIN — PROPOFOL 30 MCG/KG/MIN: 10 INJECTION, EMULSION INTRAVENOUS at 08:23

## 2024-02-26 RX ADMIN — PROPOFOL 10 MG: 10 INJECTION, EMULSION INTRAVENOUS at 10:32

## 2024-02-26 RX ADMIN — ROCURONIUM BROMIDE 30 MG: 50 INJECTION, SOLUTION INTRAVENOUS at 08:20

## 2024-02-26 RX ADMIN — OXYCODONE HYDROCHLORIDE 5 MG: 5 TABLET ORAL at 11:43

## 2024-02-26 RX ADMIN — PROPOFOL 10 MG: 10 INJECTION, EMULSION INTRAVENOUS at 10:30

## 2024-02-26 RX ADMIN — Medication 10 MG: at 09:17

## 2024-02-26 RX ADMIN — SODIUM CHLORIDE, POTASSIUM CHLORIDE, SODIUM LACTATE AND CALCIUM CHLORIDE: 600; 310; 30; 20 INJECTION, SOLUTION INTRAVENOUS at 07:52

## 2024-02-26 RX ADMIN — ONDANSETRON 4 MG: 2 INJECTION INTRAMUSCULAR; INTRAVENOUS at 10:18

## 2024-02-26 RX ADMIN — ROCURONIUM BROMIDE 20 MG: 50 INJECTION, SOLUTION INTRAVENOUS at 08:38

## 2024-02-26 RX ADMIN — DEXMEDETOMIDINE HYDROCHLORIDE 10 MCG: 200 INJECTION INTRAVENOUS at 08:31

## 2024-02-26 RX ADMIN — Medication 2 G: at 07:52

## 2024-02-26 RX ADMIN — Medication 10 MG: at 10:07

## 2024-02-26 RX ADMIN — SODIUM CHLORIDE, POTASSIUM CHLORIDE, SODIUM LACTATE AND CALCIUM CHLORIDE: 600; 310; 30; 20 INJECTION, SOLUTION INTRAVENOUS at 12:36

## 2024-02-26 RX ADMIN — FENTANYL CITRATE 50 MCG: 50 INJECTION, SOLUTION INTRAMUSCULAR; INTRAVENOUS at 11:08

## 2024-02-26 RX ADMIN — FENTANYL CITRATE 50 MCG: 50 INJECTION, SOLUTION INTRAMUSCULAR; INTRAVENOUS at 10:56

## 2024-02-26 RX ADMIN — MIDAZOLAM 2 MG: 1 INJECTION INTRAMUSCULAR; INTRAVENOUS at 07:53

## 2024-02-26 RX ADMIN — Medication 30 MG: at 08:18

## 2024-02-26 RX ADMIN — ROCURONIUM BROMIDE 50 MG: 50 INJECTION, SOLUTION INTRAVENOUS at 08:02

## 2024-02-26 RX ADMIN — OXYCODONE HYDROCHLORIDE 5 MG: 5 TABLET ORAL at 12:53

## 2024-02-26 RX ADMIN — PROPOFOL 10 MG: 10 INJECTION, EMULSION INTRAVENOUS at 10:38

## 2024-02-26 RX ADMIN — FENTANYL CITRATE 50 MCG: 50 INJECTION INTRAMUSCULAR; INTRAVENOUS at 08:04

## 2024-02-26 ASSESSMENT — ACTIVITIES OF DAILY LIVING (ADL)
ADLS_ACUITY_SCORE: 35

## 2024-02-26 ASSESSMENT — LIFESTYLE VARIABLES: TOBACCO_USE: 1

## 2024-02-26 ASSESSMENT — ENCOUNTER SYMPTOMS: SEIZURES: 0

## 2024-02-26 NOTE — OR NURSING
Pt voided in Phase 2 and made aware if unable to void after 8-10 hours after surgery and drinking plenty of fluids to call her surgeon.

## 2024-02-26 NOTE — BRIEF OP NOTE
POST OPERATIVE NOTE-IMMEDIATE :    Procedures:  Procedure(s):  robotic assisted total laparoscopic hysterectomy , bilateral salpingectomy , RIGHT ureterolysis    Preoperative Diagnosis:  Breast cancer (H) [C50.919]  Uterine fibroid [D25.9]  Vaginal bleeding [N93.9]    Postoperative Diagnosis:  Same with intraoperative frozen section analysis (endometrium) negative for malignancy    Prosthetic Devices:  None    Surgeon(s) and Assistants (if any):  Surgeon(s):  Stephanie Hobbs PA-C Mallen, Adrianne R, MD  Circulator: Otis Medley RN; Kymberly Gonzales RN  Relief Circulator: Stephanie Wolf RN  Relief Scrub: Shira Dawson  Scrub Person: Joyce Mejia    Anesthesia:  General    Estimated Blood Loss: 50 cc    IV Fluids: 1700 ml crystalloid    Urine Output: 15 ml     Drains: None    Specimens:    ID Type Source Tests Collected by Time Destination   1 : frozen on endometrium Tissue Uterus, Cervix, Bilateral Fallopian Tubes SURGICAL PATHOLOGY EXAM Anna Bajwa MD 2/26/2024  9:38 AM        Complications: None    Findings/Conclusions: On laparoscopy, there was moderately enlarged, multifibroid uterus with paracervical fibroid on right side. Bilateral ovaries were polycystic-appearing and mildly enlarged, but, grossly normal. Bilateral ureters were without evidence of hydronephrosis. There was no bulky retroperitoneal adenopathy. Bilateral sentinel lymph node mapping was performed and notable for bilateral obturator space. Intraoperative frozen section analysis revealed endometrial polyp that was negative for malignancy, thus, no cancer staging procedures were indicated. Peritoneal surfaces were smooth including bilateral hemidiaphragms. The liver edge, stomach and omentum were all grossly normal. Small bowel, appendix and colon were without any obvious abnormalities. Bilateral ovaries were preserved and notable for good perfusion throughout the case. Neither side had excess laxity. A  total of 10 ml of Surgiflo was placed along surgical dissection sites at the conclusion of the procedure.    At the conclusion of the procedure, the vagina was inspected and there were no perineal lacerations. The vaginal cuff was palpably intact. There was no vaginal bleeding.    Condition on discharge from OR:  Satisfactory    Anna Bajwa MD  Gynecologic Oncology  [facility]  [facility]

## 2024-02-26 NOTE — DISCHARGE INSTRUCTIONS
Today you were given 1,000 mg of Tylenol at 7:30 am. The recommended daily maximum dose is 4000 mg.  *Can take add'l tylenol at home.      Same Day Surgery Discharge Instructions for  Sedation and General Anesthesia     It's not unusual to feel dizzy, light-headed or faint for up to 24 hours after surgery or while taking pain medication.  If you have these symptoms: sit for a few minutes before standing and have someone assist you when you get up to walk or use the bathroom.    You should rest and relax for the next 24 hours. We recommend you make arrangements to have an adult stay with you for at least 24 hours after your discharge.  Avoid hazardous and strenuous activity.    DO NOT DRIVE any vehicle or operate mechanical equipment for 24 hours following the end of your surgery.  Even though you may feel normal, your reactions may be affected by the medication you have received.    Do not drink alcoholic beverages for 24 hours following surgery.     Slowly progress to your regular diet as you feel able. It's not unusual to feel nauseated and/or vomit after receiving anesthesia.  If you develop these symptoms, drink clear liquids (apple juice, ginger ale, broth, 7-up, etc. ) until you feel better.  If your nausea and vomiting persists for 24 hours, please notify your surgeon.      All narcotic pain medications, along with inactivity and anesthesia, can cause constipation. Drinking plenty of liquids and increasing fiber intake will help.    For any questions of a medical nature, call your surgeon.    Do not make important decisions for 24 hours.    If you had general anesthesia, you may have a sore throat for a couple of days related to the breathing tube used during surgery.  You may use Cepacol lozenges to help with this discomfort.  If it worsens or if you develop a fever, contact your surgeon.     If you feel your pain is not well managed with the pain medications prescribed by your surgeon, please contact your  surgeon's office to let them know so they can address your concerns.      Today you received Toradol, an antiinflammatory medication similar to Ibuprofen.  You should not take other antiinflammatory medication, such as Ibuprofen, Motrin, Advil, Aleve, Naprosyn, etc until 4:23PM

## 2024-02-26 NOTE — OR NURSING
VSS, c /o pain-add'l medication gien for pain control. Abd CDI, ice applied. Discharge instructions reviewed w/pt and spouse. Verbal understanding given to RN. Pt needs to void prior to discharge home.

## 2024-02-26 NOTE — ANESTHESIA PROCEDURE NOTES
Airway       Patient location during procedure: OR       Procedure Start/Stop Times: 2/26/2024 8:04 AM  Staff -        Anesthesiologist:  Aguilar Herrera MD       CRNA: Zain Carvajal APRN CRNA       Performed By: CRNA  Consent for Airway        Urgency: elective  Indications and Patient Condition       Indications for airway management: micah-procedural       Induction type:intravenous       Mask difficulty assessment: 1 - vent by mask    Final Airway Details       Final airway type: endotracheal airway       Successful airway: ETT - single and Oral  Endotracheal Airway Details        ETT size (mm): 7.0       Cuffed: yes       Successful intubation technique: direct laryngoscopy       DL Blade Type: Montalvo 2       Grade View of Cords: 1       Adjucts: stylet       Position: Right       Measured from: lips       Secured at (cm): 21       Bite block used: None    Post intubation assessment        Placement verified by: capnometry, equal breath sounds and chest rise        Number of attempts at approach: 1       Number of other approaches attempted: 0       Secured with: tape       Ease of procedure: easy       Dentition: Intact and Unchanged    Medication(s) Administered   Medication Administration Time: 2/26/2024 8:04 AM

## 2024-02-26 NOTE — ANESTHESIA POSTPROCEDURE EVALUATION
Patient: Adelaida Tolentino    Procedure: Procedure(s):  robotic assisted total laparoscopic hysterectomy , bilateral salpingectomy , RIGHT ureterolysis       Anesthesia Type:  General    Note:  Disposition: Outpatient   Postop Pain Control: Uneventful            Sign Out: Well controlled pain   PONV: No   Neuro/Psych: Uneventful            Sign Out: Acceptable/Baseline neuro status   Airway/Respiratory: Uneventful            Sign Out: Acceptable/Baseline resp. status   CV/Hemodynamics: Uneventful            Sign Out: Acceptable CV status   Other NRE: NONE   DID A NON-ROUTINE EVENT OCCUR? No           Last vitals:  Vitals Value Taken Time   /64 02/26/24 1145   Temp 36.6  C (97.8  F) 02/26/24 1145   Pulse 75 02/26/24 1153   Resp 19 02/26/24 1153   SpO2 98 % 02/26/24 1153   Vitals shown include unfiled device data.    Electronically Signed By: Aguilar Herrera MD  February 26, 2024  1:57 PM

## 2024-02-26 NOTE — ANESTHESIA PREPROCEDURE EVALUATION
Anesthesia Pre-Procedure Evaluation    Patient: Adelaida Tolentino   MRN: [id number] : 1980        Procedure : Procedure(s):  robotic assisted total laparoscopic hysterectomy , bilateral salpingectomy , possible lymph node dissection , possible mini laparotomy          History reviewed. No pertinent past medical history.   Past Surgical History:   Procedure Laterality Date    BREAST SURGERY Bilateral     Mastectomy and recontruction      Allergies   Allergen Reactions    Seasonal Allergies     Adhesive Tape Rash      Social History     Tobacco Use    Smoking status: Never    Smokeless tobacco: Never   Substance Use Topics    Alcohol use: Yes     Comment: Occasionally=on weekends      Wt Readings from Last 1 Encounters:   24 66.3 kg (146 lb 3.2 oz)        Anesthesia Evaluation   Pt has had prior anesthetic.     History of anesthetic complications  - PONV.      ROS/MED HX  ENT/Pulmonary:     (+)                tobacco use, Past use,                       Neurologic:    (-) no seizures and no CVA   Cardiovascular:     (+)  hypertension- -   -  - -                                      METS/Exercise Tolerance: >4 METS    Hematologic:       Musculoskeletal:       GI/Hepatic:    (-) GERD and liver disease   Renal/Genitourinary:    (-) renal disease   Endo:    (-) Type II DM, thyroid disease and obesity   Psychiatric/Substance Use:     (+) psychiatric history anxiety       Infectious Disease:       Malignancy:   (+) Malignancy, History of Breast.Breast CA Remission status post Surgery.      Other:            Physical Exam    Airway        Mallampati: II   TM distance: > 3 FB   Neck ROM: full   Mouth opening: > 3 cm    Respiratory Devices and Support         Dental  no notable dental history     (+) Minor Abnormalities - some fillings, tiny chips      Cardiovascular          Rhythm and rate: regular and normal     Pulmonary   pulmonary exam normal                OUTSIDE LABS:  CBC:   Lab Results   Component  "Value Date    WBC 4.3 02/26/2024    HGB 13.1 02/26/2024    HCT 37.1 02/26/2024     02/26/2024     BMP: No results found for: \"NA\", \"POTASSIUM\", \"CHLORIDE\", \"CO2\", \"BUN\", \"CR\", \"GLC\"  COAGS: No results found for: \"PTT\", \"INR\", \"FIBR\"  POC:   Lab Results   Component Value Date    HCG Negative 02/26/2024     HEPATIC: No results found for: \"ALBUMIN\", \"PROTTOTAL\", \"ALT\", \"AST\", \"GGT\", \"ALKPHOS\", \"BILITOTAL\", \"BILIDIRECT\", \"ROLANDO\"  OTHER: No results found for: \"PH\", \"LACT\", \"A1C\", \"ILYA\", \"PHOS\", \"MAG\", \"LIPASE\", \"AMYLASE\", \"TSH\", \"T4\", \"T3\", \"CRP\", \"SED\"    Anesthesia Plan    ASA Status:  2    NPO Status:  NPO Appropriate    Anesthesia Type: General.     - Airway: ETT   Induction: Intravenous, Propofol.   Maintenance: Balanced.        Consents    Anesthesia Plan(s) and associated risks, benefits, and realistic alternatives discussed. Questions answered and patient/representative(s) expressed understanding.     - Discussed:     - Discussed with:  Patient            Postoperative Care    Pain management: IV analgesics, Multi-modal analgesia.   PONV prophylaxis: Ondansetron (or other 5HT-3), Dexamethasone or Solumedrol, Background Propofol Infusion, Aprepitant     Comments:    Other Comments: Emend 40mg PO for PONV prophylaxis  1000mg PO tylenol           Aguilar Herrera MD    I have reviewed the pertinent notes and labs in the chart from the past 30 days and (re)examined the patient.  Any updates or changes from those notes are reflected in this note.              # Overweight: Estimated body mass index is 25.9 kg/m  as calculated from the following:    Height as of this encounter: 1.6 m (5' 3\").    Weight as of this encounter: 66.3 kg (146 lb 3.2 oz).      "

## 2024-02-27 LAB
PATH REPORT.COMMENTS IMP SPEC: NORMAL
PATH REPORT.COMMENTS IMP SPEC: NORMAL
PATH REPORT.FINAL DX SPEC: NORMAL
PATH REPORT.GROSS SPEC: NORMAL
PATH REPORT.INTRAOP OBS SPEC DOC: NORMAL
PATH REPORT.MICROSCOPIC SPEC OTHER STN: NORMAL
PATH REPORT.RELEVANT HX SPEC: NORMAL
PHOTO IMAGE: NORMAL

## 2024-03-10 ENCOUNTER — HEALTH MAINTENANCE LETTER (OUTPATIENT)
Age: 44
End: 2024-03-10

## 2024-03-13 ENCOUNTER — ANCILLARY PROCEDURE (OUTPATIENT)
Dept: MRI IMAGING | Facility: CLINIC | Age: 44
End: 2024-03-13
Attending: INTERNAL MEDICINE
Payer: COMMERCIAL

## 2024-03-13 DIAGNOSIS — C50.811 MALIGNANT NEOPLASM OF OVERLAPPING SITES OF RIGHT FEMALE BREAST (H): ICD-10-CM

## 2024-03-13 PROCEDURE — 70553 MRI BRAIN STEM W/O & W/DYE: CPT | Performed by: RADIOLOGY

## 2024-03-13 PROCEDURE — A9585 GADOBUTROL INJECTION: HCPCS | Performed by: RADIOLOGY

## 2024-03-13 PROCEDURE — 76498 UNLISTED MR PROCEDURE: CPT | Performed by: RADIOLOGY

## 2024-03-13 RX ORDER — GADOBUTROL 604.72 MG/ML
7.5 INJECTION INTRAVENOUS ONCE
Status: COMPLETED | OUTPATIENT
Start: 2024-03-13 | End: 2024-03-13

## 2024-03-13 RX ADMIN — GADOBUTROL 6.5 ML: 604.72 INJECTION INTRAVENOUS at 17:33

## 2024-03-13 NOTE — DISCHARGE INSTRUCTIONS
MRI Contrast Discharge Instructions    The IV contrast you received today will pass out of your body in your  urine. This will happen in the next 24 hours. You will not feel this process.  Your urine will not change color.    Drink at least 4 extra glasses of water or juice today (unless your doctor  has restricted your fluids). This reduces the stress on your kidneys.  You may take your regular medicines.    If you are on dialysis: It is best to have dialysis today.    If you have a reaction: Most reactions happen right away. If you have  any new symptoms after leaving the hospital (such as hives or swelling),  call your hospital at the correct number below. Or call your family doctor.  If you have breathing distress or wheezing, call 911.    Special instructions: ***    I have read and understand the above information.    Signature:______________________________________ Date:___________    Staff:__________________________________________ Date:___________     Time:__________    [facility] Departments:    ___[place]: [phone]  ___[facility]: [phone]  ___[place]: [phone] ___[facility]: [phone]  ___[facility]: [phone]  ___[facility]: [phone]  ___[phone]  ___[facility]: [phone]  ___[phone]

## 2024-10-06 ENCOUNTER — HEALTH MAINTENANCE LETTER (OUTPATIENT)
Age: 44
End: 2024-10-06

## (undated) DEVICE — TUBING CONMED AIRSEAL SMOKE EVAC INSUFFLATION ASM-EVAC

## (undated) DEVICE — KIT PATIENT POSITIONING PIGAZZI LATEX FREE 40580

## (undated) DEVICE — TUBING IV EXTENSION SET ANESTHESIA 34" MLL 2C6227

## (undated) DEVICE — DAVINCI XI TISSUE SEALER SYNCROSEAL 8MM 480440

## (undated) DEVICE — ESU GROUND PAD UNIVERSAL W/O CORD

## (undated) DEVICE — DAVINCI XI SEAL UNIVERSAL 5-8MM 470361

## (undated) DEVICE — DAVINCI XI MONOPOLAR SCISSORS HOT SHEARS 8MM 470179

## (undated) DEVICE — LINEN TOWEL PACK X5 5464

## (undated) DEVICE — DECANTER VIAL 2006S

## (undated) DEVICE — DAVINCI XI NDL DRIVER LARGE 470006

## (undated) DEVICE — SOL WATER IRRIG 1000ML BOTTLE 2F7114

## (undated) DEVICE — DRAPE CV SPLIT II 147X106" 9158

## (undated) DEVICE — PACK DAVINCI GYN SMA15GDFS1

## (undated) DEVICE — GLOVE BIOGEL PI MICRO SZ 6.5 48565

## (undated) DEVICE — SOL NACL 0.9% INJ 1000ML BAG 2B1324X

## (undated) DEVICE — DAVINCI HOT SHEARS TIP COVER  400180

## (undated) DEVICE — RX SURGIFLO HEMOSTATIC MATRIX W/THROMBIN 8ML 2994

## (undated) DEVICE — DAVINCI XI DRAPE ARM 470015

## (undated) DEVICE — GLOVE BIOGEL PI MICRO INDICATOR UNDERGLOVE SZ 6.5 48965

## (undated) DEVICE — RETR ELEV / UTERINE MANIPULATOR V-CARE MED CUP 60-6085-201A

## (undated) DEVICE — DAVINCI XI OBTURATOR BLADELESS 8MM 470359

## (undated) DEVICE — DRAPE IOBAN INCISE 23X17" 6650EZ

## (undated) DEVICE — SOL NACL 0.9% IRRIG 1000ML BOTTLE 2F7124

## (undated) DEVICE — SU MONOCRYL 4-0 PS-2 18" UND Y496G

## (undated) DEVICE — CATH TRAY FOLEY SURESTEP 16FR W/URNE MTR STLK LATEX A303316A

## (undated) DEVICE — ANTIFOG SOLUTION SEE SHARP 150M TROCAR SWABS 30978

## (undated) DEVICE — DAVINCI XI ESU FCP BIPOLAR MARYLAND 470172

## (undated) DEVICE — NDL INSUFFLATION 13GA 120MM C2201

## (undated) DEVICE — SU WND CLOSURE VLOC 180 ABS 3-0 6" V-20 VLOCL0604

## (undated) DEVICE — SUCTION IRR STRYKERFLOW II W/TIP 250-070-520

## (undated) DEVICE — DAVINCI XI GRASPER ENDOWRIST PROGRASP 470093

## (undated) DEVICE — DAVINCI XI DRAPE COLUMN 470341

## (undated) DEVICE — SPONGE LAP 18X18" X8435

## (undated) RX ORDER — DEXAMETHASONE SODIUM PHOSPHATE 4 MG/ML
INJECTION, SOLUTION INTRA-ARTICULAR; INTRALESIONAL; INTRAMUSCULAR; INTRAVENOUS; SOFT TISSUE
Status: DISPENSED
Start: 2024-02-26

## (undated) RX ORDER — CEFAZOLIN SODIUM/WATER 2 G/20 ML
SYRINGE (ML) INTRAVENOUS
Status: DISPENSED
Start: 2024-02-26

## (undated) RX ORDER — NEOSTIGMINE METHYLSULFATE 1 MG/ML
VIAL (ML) INJECTION
Status: DISPENSED
Start: 2024-02-26

## (undated) RX ORDER — LABETALOL HYDROCHLORIDE 5 MG/ML
INJECTION, SOLUTION INTRAVENOUS
Status: DISPENSED
Start: 2024-02-26

## (undated) RX ORDER — FENTANYL CITRATE 0.05 MG/ML
INJECTION, SOLUTION INTRAMUSCULAR; INTRAVENOUS
Status: DISPENSED
Start: 2024-02-26

## (undated) RX ORDER — METRONIDAZOLE 500 MG/100ML
INJECTION, SOLUTION INTRAVENOUS
Status: DISPENSED
Start: 2024-02-26

## (undated) RX ORDER — ONDANSETRON 2 MG/ML
INJECTION INTRAMUSCULAR; INTRAVENOUS
Status: DISPENSED
Start: 2024-02-26

## (undated) RX ORDER — OXYMETAZOLINE HYDROCHLORIDE 0.05 G/100ML
SPRAY NASAL
Status: DISPENSED
Start: 2024-02-26

## (undated) RX ORDER — OXYCODONE HYDROCHLORIDE 5 MG/1
TABLET ORAL
Status: DISPENSED
Start: 2024-02-26

## (undated) RX ORDER — APREPITANT 40 MG/1
CAPSULE ORAL
Status: DISPENSED
Start: 2024-02-26

## (undated) RX ORDER — KETOROLAC TROMETHAMINE 30 MG/ML
INJECTION, SOLUTION INTRAMUSCULAR; INTRAVENOUS
Status: DISPENSED
Start: 2024-02-26

## (undated) RX ORDER — BUPIVACAINE HYDROCHLORIDE 5 MG/ML
INJECTION, SOLUTION EPIDURAL; INTRACAUDAL
Status: DISPENSED
Start: 2024-02-26

## (undated) RX ORDER — FENTANYL CITRATE 50 UG/ML
INJECTION, SOLUTION INTRAMUSCULAR; INTRAVENOUS
Status: DISPENSED
Start: 2024-02-26

## (undated) RX ORDER — PROPOFOL 10 MG/ML
INJECTION, EMULSION INTRAVENOUS
Status: DISPENSED
Start: 2024-02-26

## (undated) RX ORDER — ACETAMINOPHEN 500 MG
TABLET ORAL
Status: DISPENSED
Start: 2024-02-26

## (undated) RX ORDER — INDOCYANINE GREEN AND WATER 25 MG
KIT INJECTION
Status: DISPENSED
Start: 2024-02-26

## (undated) RX ORDER — HEPARIN SODIUM 5000 [USP'U]/.5ML
INJECTION, SOLUTION INTRAVENOUS; SUBCUTANEOUS
Status: DISPENSED
Start: 2024-02-26